# Patient Record
Sex: FEMALE | Race: OTHER
[De-identification: names, ages, dates, MRNs, and addresses within clinical notes are randomized per-mention and may not be internally consistent; named-entity substitution may affect disease eponyms.]

---

## 2019-12-31 ENCOUNTER — HOSPITAL ENCOUNTER (EMERGENCY)
Dept: HOSPITAL 25 - UCEAST | Age: 84
Discharge: HOME | End: 2019-12-31
Payer: COMMERCIAL

## 2019-12-31 VITALS — DIASTOLIC BLOOD PRESSURE: 58 MMHG | SYSTOLIC BLOOD PRESSURE: 146 MMHG

## 2019-12-31 DIAGNOSIS — J06.9: Primary | ICD-10-CM

## 2019-12-31 DIAGNOSIS — Z79.899: ICD-10-CM

## 2019-12-31 DIAGNOSIS — I10: ICD-10-CM

## 2019-12-31 LAB
FLUAV RNA SPEC QL NAA+PROBE: NEGATIVE
FLUBV RNA SPEC QL NAA+PROBE: NEGATIVE

## 2019-12-31 PROCEDURE — G0463 HOSPITAL OUTPT CLINIC VISIT: HCPCS

## 2019-12-31 PROCEDURE — 99202 OFFICE O/P NEW SF 15 MIN: CPT

## 2019-12-31 PROCEDURE — 71046 X-RAY EXAM CHEST 2 VIEWS: CPT

## 2019-12-31 NOTE — UC
FLU HPI





- HPI Summary


HPI Summary: 





85 yo female presents, accompanied by daughter, with URI symptoms. Pt speaks 

little English and primarily speaks [place], therefore the daughter with her 

today is translating. They tell me that over the last 7-10 days pt has had a 

worsening sinus congestion and non-productive cough. Has been taking OTC cough 

medication with mild relief. Denies fever, chills, SOB, n/v. No smoking. 





- History of Current Complaint


Chief Complaint: UCGeneralIllness


Stated Complaint: COUGH, ACHES


Time Seen by Provider: 12/31/19 10:49


Hx Obtained From: Patient


Severity Currently: Moderate


Severity Initially: Moderate


Pain Intensity: 5


Pain Scale Used: 0-10 Numeric





- Allergy/Home Medications


Allergies/Adverse Reactions: 


 Allergies











Allergy/AdvReac Type Severity Reaction Status Date / Time


 


No Known Allergies Allergy   Verified 12/31/19 10:14











Home Medications: 


 Home Medications





Latanoprost/Pf [Latanoprost 0.005% Eye Drop] 1 % BOTH EYES DAILY 12/31/19 [

History Confirmed 12/31/19]


Losartan Potassium 1 tab PO DAILY 12/31/19 [History Confirmed 12/31/19]


Simvastatin 1 tab PO DAILY 12/31/19 [History Confirmed 12/31/19]











PMH/Surg Hx/FS Hx/Imm Hx


Cardiovascular History: Hypertension





- Surgical History


Surgical History: Yes


Surgery Procedure, Year, and Place: foot





- Family History


Known Family History: Positive: Unknown





- Social History


Occupation: Retired


Lives: With Family


Alcohol Use: None


Substance Use Type: None


Smoking Status (MU): Never Smoked Tobacco





Review of Systems


All Other Systems Reviewed And Are Negative: No


Constitutional: Positive: Negative


Skin: Positive: Negative


Eyes: Positive: Negative


ENT: Positive: Nasal Discharge, Sinus Congestion, Sinus Pain/Tenderness


Respiratory: Positive: Cough


Cardiovascular: Positive: Negative


Gastrointestinal: Positive: Negative


Neurological: Positive: Negative


Psychological: Positive: Negative





Physical Exam





- Summary


Physical Exam Summary: 





GENERAL: NAD. WDWN. No pain distress.


SKIN: No rashes, sores, lesions, or open wounds.


HEENT:


            Head: AT/NC


            Eyes: EOM intact. Conjunctiva clear without inflammation or 

discharge.


            Ears: Hearing grossly normal. TMs intact, no bulging, erythema, or 

edema. 


            Nose: Nasal mucosa pink and moist. NTTP maxillary and frontal 

sinus. 


            Throat: Posterior oropharynx without exudates, erythema, or 

tonsillar enlargement.  Uvula midline.


NECK: Supple. Nontender. No lymphadenopathy. 


CHEST:  CTAB. No accessory muscle use. Breathing comfortably and in no distress.


CV:  RRR. Pulses intact. Cap refill <2seconds


NEURO: Alert. 


PSYCH: Age appropriate behavior.


Triage Information Reviewed: Yes


Vital Signs: 


 Initial Vital Signs











Temp  98 F   12/31/19 10:11


 


Pulse  80   12/31/19 10:11


 


Resp  18   12/31/19 10:11


 


BP  146/58   12/31/19 10:11


 


Pulse Ox  100   12/31/19 10:11











Vital Signs Reviewed: Yes





Diagnostics





- Radiology


  ** CXR


Radiology Interpretation Completed By: Radiologist


Summary of Radiographic Findings: IMPRESSION: No acute cardiopulmonary process 

by radiograph.





Flu Course/Dx





- Course


Course Of Treatment: 





CXR as above.


Suspect URI - given age and length of symptoms with worsening symptoms will 

start anbx at this time.





- Differential Dx/Diagnosis


Provider Diagnosis: 


 URI (upper respiratory infection)








Discharge ED





- Sign-Out/Discharge


Documenting (check all that apply): Patient Departure


All imaging exams completed and their final reports reviewed: Yes





- Discharge Plan


Condition: Stable


Disposition: HOME


Prescriptions: 


Amoxicillin PO (*) [Amoxicillin 875 MG (*)] 875 mg PO BID #14 tab


Benzonatate CAP* [Tessalon 100 MG CAP*] 100 mg PO TID PRN #21 cap


 PRN Reason: Cough


Codeine Phosphate/Guaifenesin [Guaifen-Codeine 100-10 mg/5 ml] 5 ml PO BEDTIME 

PRN #35 ml MDD 5ml


 PRN Reason: Cough


Patient Education Materials:  Upper Respiratory Infection (ED)


Referrals: 


Rose Aguilar MD [Primary Care Provider] - 


Additional Instructions: 


If you develop a fever, shortness of breath, chest pain, new or worsening 

symptoms - please call your PCP or go to the ED immediately.


 





Your blood pressure was high at todays visit. Please see your primary provider 

within 4 weeks for recheck and re-evaluation.








- Billing Disposition and Condition


Condition: STABLE


Disposition: Home